# Patient Record
Sex: MALE | Race: WHITE | Employment: OTHER | URBAN - METROPOLITAN AREA
[De-identification: names, ages, dates, MRNs, and addresses within clinical notes are randomized per-mention and may not be internally consistent; named-entity substitution may affect disease eponyms.]

---

## 2021-12-21 RX ORDER — FINASTERIDE 1 MG/1
1 TABLET, FILM COATED ORAL DAILY
COMMUNITY

## 2021-12-21 NOTE — PROGRESS NOTES
Have you been tested for COVID  No           Have you been told you were positive for COVID Yes 09/2021  Have you had any known exposure to someone that is positive for COVID No  Do you have a cough                   No              Do you have shortness of breath No                 Do you have a sore throat            No                Are you having chills                    No                Are you having muscle aches. No                    Please come to the hospital wearing a mask and have your significant other wear a mask as well. Both of you should check your temperature before leaving to come here,  if it is 100 or higher please call [phone] for instruction. [facility] PRE-ADMISSION TESTING INSTRUCTIONS    The Preadmission Testing patient is instructed accordingly using the following criteria (check applicable):    ARRIVAL INSTRUCTIONS:  [x] Parking the day of Surgery is located in the Main Entrance lot. Upon entering the door, make an immediate right to the surgery reception desk    [x] Bring photo ID and insurance card    [] Bring in a copy of Living will or Durable Power of  papers.     [x] Please be sure to arrange for responsible adult to provide transportation to and from the hospital    [x] Please arrange for responsible adult to be with you for the 24 hour period post procedure due to having anesthesia      GENERAL INSTRUCTIONS:    [x] Nothing by mouth after midnight, including gum, candy, mints or water    [x] You may brush your teeth, but do not swallow any water    [x] Take medications as instructed with 1-2 oz of water    [] Stop herbal supplements and vitamins 5 days prior to procedure    [] Follow preop dosing of blood thinners per physician instructions    [] Take 1/2 dose of evening insulin, but no insulin after midnight    [] No oral diabetic medications after midnight    [] If diabetic and have low blood sugar or feel symptomatic, take 1-2oz apple juice only    [] Bring inhalers day of surgery    [] Bring C-PAP/ Bi-Pap day of surgery    [] Bring urine specimen day of surgery    [x] Shower or bath with soap, lather and rinse well, AM of Surgery, no lotion, powders or creams to surgical site    [x] Follow bowel prep as instructed per surgeon    [x] No tobacco products within 24 hours of surgery     [x] No alcohol or illegal drug use within 24 hours of surgery.     [x] Jewelry, body piercing's, eyeglasses, contact lenses and dentures are not permitted into surgery (bring cases)      [x] Please do not wear any nail polish, make up or hair products on the day of surgery    [x] You can expect a call the business day prior to procedure to notify you if your arrival time changes    [x] If you receive a survey after surgery we would greatly appreciate your comments    [] Parent/guardian of a minor must accompany their child and remain on the premises  the entire time they are under our care     [] Pediatric patients may bring favorite toy, blanket or comfort item with them    [] A caregiver or family member must remain with the patient during their stay if they are mentally handicapped, have dementia, disoriented or unable to use a call light or would be a safety concern if left unattended    [x] Please notify surgeon if you develop any illness between now and time of surgery (cold, cough, sore throat, fever, nausea, vomiting) or any signs of infections  including skin, wounds, and dental.    [x]  The Outpatient Pharmacy is available to fill your prescription here on your day of surgery, ask your preop nurse for details    [] Other instructions    EDUCATIONAL MATERIALS PROVIDED:    [] PAT Preoperative Education Packet/Booklet     [] Medication List    [] Transfusion bracelet applied with instructions    [] Shower with soap, lather and rinse well, and use CHG wipes provided the evening before surgery as instructed    [] Incentive spirometer with instructions

## 2021-12-22 ENCOUNTER — ANESTHESIA EVENT (OUTPATIENT)
Dept: ENDOSCOPY | Age: 51
End: 2021-12-22
Payer: COMMERCIAL

## 2021-12-22 ENCOUNTER — PREP FOR PROCEDURE (OUTPATIENT)
Dept: SURGERY | Age: 51
End: 2021-12-22

## 2021-12-22 ENCOUNTER — HOSPITAL ENCOUNTER (OUTPATIENT)
Age: 51
Setting detail: OUTPATIENT SURGERY
Discharge: HOME | End: 2021-12-22
Attending: SURGERY | Admitting: SURGERY
Payer: COMMERCIAL

## 2021-12-22 ENCOUNTER — ANESTHESIA (OUTPATIENT)
Dept: ENDOSCOPY | Age: 51
End: 2021-12-22
Payer: COMMERCIAL

## 2021-12-22 VITALS
OXYGEN SATURATION: 96 % | SYSTOLIC BLOOD PRESSURE: 128 MMHG | BODY MASS INDEX: 29.62 KG/M2 | DIASTOLIC BLOOD PRESSURE: 70 MMHG | TEMPERATURE: 98 F | HEART RATE: 78 BPM | RESPIRATION RATE: 16 BRPM | WEIGHT: 200 LBS | HEIGHT: 69 IN

## 2021-12-22 VITALS — OXYGEN SATURATION: 94 % | SYSTOLIC BLOOD PRESSURE: 125 MMHG | DIASTOLIC BLOOD PRESSURE: 83 MMHG

## 2021-12-22 DIAGNOSIS — Z01.818 PREOP TESTING: Primary | ICD-10-CM

## 2021-12-22 PROCEDURE — 7100000011 HC PHASE II RECOVERY - ADDTL 15 MIN: Performed by: SURGERY

## 2021-12-22 PROCEDURE — 7100000010 HC PHASE II RECOVERY - FIRST 15 MIN: Performed by: SURGERY

## 2021-12-22 PROCEDURE — 3700000000 HC ANESTHESIA ATTENDED CARE: Performed by: SURGERY

## 2021-12-22 PROCEDURE — 3609012400 HC EGD TRANSORAL BIOPSY SINGLE/MULTIPLE: Performed by: SURGERY

## 2021-12-22 PROCEDURE — 3609027000 HC COLONOSCOPY: Performed by: SURGERY

## 2021-12-22 PROCEDURE — 3700000001 HC ADD 15 MINUTES (ANESTHESIA): Performed by: SURGERY

## 2021-12-22 PROCEDURE — 88305 TISSUE EXAM BY PATHOLOGIST: CPT

## 2021-12-22 PROCEDURE — 2709999900 HC NON-CHARGEABLE SUPPLY: Performed by: SURGERY

## 2021-12-22 RX ORDER — SODIUM CHLORIDE 9 MG/ML
INJECTION, SOLUTION INTRAVENOUS CONTINUOUS
Status: CANCELLED | OUTPATIENT
Start: 2021-12-22

## 2021-12-22 ASSESSMENT — PAIN - FUNCTIONAL ASSESSMENT: PAIN_FUNCTIONAL_ASSESSMENT: 0-10

## 2021-12-22 NOTE — ANESTHESIA POSTPROCEDURE EVALUATION
Department of Anesthesiology  Postprocedure Note    Patient: Martín Simental  MRN: [id number]  YOB: 1970  Date of evaluation: 12/22/2021  Time:  3:38 PM     Procedure Summary     Date: 12/22/21 Room / Location: [address] / [facility]    Anesthesia Start: 6175 Anesthesia Stop: 0921    Procedures:       COLORECTAL CANCER SCREENING, NOT HIGH RISK (N/A )      EGD BIOPSY Diagnosis: (SCREENING)    Surgeons: Yisel Ga MD Responsible Provider: Panfilo Brownlee MD    Anesthesia Type: MAC ASA Status: 1          Anesthesia Type: MAC    Atilio Phase I: Atilio Score: 10    Atilio Phase II: Atilio Score: 10    Last vitals: Reviewed and per EMR flowsheets.        Anesthesia Post Evaluation    Patient location during evaluation: PACU  Patient participation: complete - patient participated  Level of consciousness: awake and alert  Pain score: 0  Airway patency: patent  Nausea & Vomiting: no vomiting and no nausea  Complications: no  Cardiovascular status: hemodynamically stable  Respiratory status: spontaneous ventilation  Hydration status: stable

## 2021-12-22 NOTE — ANESTHESIA PRE PROCEDURE
Department of Anesthesiology  Preprocedure Note       Name:  Patricia Garcia   Age:  46 y.o.  :  1970                                          MRN:  [id number]         Date:  2021      Surgeon: Sharyn Interiano):  Ada Serrano MD    Procedure: Procedure(s):  COLORECTAL CANCER SCREENING, NOT HIGH RISK    Medications prior to admission:   Prior to Admission medications    Medication Sig Start Date End Date Taking? Authorizing Provider   finasteride (PROPECIA) 1 MG tablet Take 1 mg by mouth daily   Yes Historical Provider, MD   amphetamine-dextroamphetamine (ADDERALL XR) 10 MG extended release capsule Take 10 mg by mouth every morning   Yes Historical Provider, MD       Current medications:    No current facility-administered medications for this encounter. Allergies:  No Known Allergies    Problem List:  There is no problem list on file for this patient.       Past Medical History:        Diagnosis Date    Abdominal pain     luq    ADD (attention deficit disorder)     Anesthesia complication     family history ONLY of Anectine allergy sister slow to wake up from anesthesia    Constipation     COVID-19        Past Surgical History:        Procedure Laterality Date    BICEPS TENDON REPAIR      TONSILLECTOMY         Social History:    Social History     Tobacco Use    Smoking status: Current Some Day Smoker    Smokeless tobacco: Current User     Types: Snuff   Substance Use Topics    Alcohol use: Yes     Comment: 12-16 beers per week                                Ready to quit: Not Answered  Counseling given: Not Answered      Vital Signs (Current):   Vitals:    21 0928 21 0654   BP:  126/82   Pulse:  82   Resp:  18   Temp:  98 °F (36.7 °C)   TempSrc:  Temporal   SpO2:  98%   Weight: 200 lb (90.7 kg) 200 lb (90.7 kg)   Height: 5' 8.5\" (1.74 m) 5' 8.5\" (1.74 m)                                              BP Readings from Last 3 Encounters:   21 126/82   16 135/82 04/06/12 118/72       NPO Status:                                                                                 BMI:   Wt Readings from Last 3 Encounters:   12/22/21 200 lb (90.7 kg)   09/30/16 189 lb (85.7 kg)   04/06/12 200 lb (90.7 kg)     Body mass index is 29.97 kg/m². CBC:   Lab Results   Component Value Date    WBC 9.4 08/20/2012    RBC 4.77 08/20/2012    HGB 15.9 08/20/2012    HCT 46.2 08/20/2012    MCV 97.0 08/20/2012    RDW 12.8 08/20/2012     08/20/2012       CMP:   Lab Results   Component Value Date     08/20/2012    K 4.0 08/20/2012     08/20/2012    CO2 25 08/20/2012    BUN 16 08/20/2012    CREATININE 0.9 08/20/2012    LABGLOM >60 08/20/2012    GLUCOSE 94 08/20/2012    CALCIUM 9.4 08/20/2012       POC Tests: No results for input(s): POCGLU, POCNA, POCK, POCCL, POCBUN, POCHEMO, POCHCT in the last 72 hours. Coags: No results found for: PROTIME, INR, APTT    HCG (If Applicable): No results found for: PREGTESTUR, PREGSERUM, HCG, HCGQUANT     ABGs: No results found for: PHART, PO2ART, YKS6COC, FJM7CKG, BEART, Y9UKJSJH     Type & Screen (If Applicable):  No results found for: LABABO, LABRH    Drug/Infectious Status (If Applicable):  No results found for: HIV, HEPCAB    COVID-19 Screening (If Applicable): No results found for: COVID19        Anesthesia Evaluation  Patient summary reviewed and Nursing notes reviewed no history of anesthetic complications (sister has a problem with anectine): Airway: Mallampati: II  TM distance: >3 FB   Neck ROM: full  Mouth opening: > = 3 FB Dental: normal exam         Pulmonary:normal exam                               Cardiovascular:  Exercise tolerance: good (>4 METS),                     Neuro/Psych:   (+) psychiatric history:            GI/Hepatic/Renal:   (+) bowel prep,           Endo/Other: Negative Endo/Other ROS                    Abdominal:             Vascular: negative vascular ROS.          Other Findings:             Anesthesia Plan      MAC     ASA 1       Induction: intravenous. Anesthetic plan and risks discussed with patient. Plan discussed with CRNA. Chart reviewed & patient seen. I agree with the above note.   BEAN Hannon - CRNA        Hiro Shaffer MD   12/22/2021

## 2021-12-22 NOTE — OP NOTE
COLONOSCOPY PROCEDURE NOTE    DATE OF PROCEDURE: 12/22/2021    PREOPERATIVE DIAGNOSIS:  Screening     POSTOPERATIVE DIAGNOSIS/FINDINGS:  Sigmoid diverticulosis, repeat in 10 years     SURGEON: Sarahi Holguin MD    ASSISTANT: None    OPERATION: Total Colonoscopy     ANESTHESIA: Local monitored anesthesia. CONDITION: Stable    COMPLICATIONS: None. Specimens Removed: as above    EBL: None      BRIEF HISTORY:  This is a 46 y.o. male who presents with the complaint of screening. It was recommended the patient undergo a colonoscopy. The risks/benefits/alternatives/expected outcomes were explained the the patient. The patient verbalized understanding and agreed to proceed. PROCEDURE:  The patient was brought into the endoscopy suite and placed in the left lateral decubitus position. A digital rectal exam was performed after the initiation of LMAC anesthesia and failed to reveal any obstructing masses or lesions. A colonoscope was inserted into the patient's anus and passed through the rectum, sigmoid, descending, transverse, and ascending colon all the way to the level of the cecum. Visualization of the cecum was confirmed by visualization of the ileo-cecal valve, confluence of the tinea, and by visualization of the light in the RLQ on the anterior abdominal wall. The scope was then withdrawn the entire length of the colon. There were no masses, polyps, or lesions noted. Sigmoid diverticulosis noted. Upon reaching the anus, the scope was retroflexed. There were no significant hemorrhoids noted. The scope was straightened and withdrawn entirely. The patient tolerated the procedure well and there were no complications.         Sarahi Holguin MD, MD  12/22/2021

## 2021-12-22 NOTE — H&P
General Surgery Consult    Patient's Name/Date of Birth: Chhaya Lanier / 1970    Date: December 22, 2021     Consulting Surgeon: Anju Virk M.D.    PCP: Sosa Tripp MD     Chief Complaint:     HPI:   Chhaya Lanier is a 46 y.o. male who presents for evaluation of GERD. He also has never had c scope in past.        Past Medical History:   Diagnosis Date    Abdominal pain     luq    ADD (attention deficit disorder)     Anesthesia complication     family history ONLY of Anectine allergy sister slow to wake up from anesthesia    Constipation     COVID-19        Past Surgical History:   Procedure Laterality Date    BICEPS TENDON REPAIR      TONSILLECTOMY         No current facility-administered medications for this encounter. No Known Allergies    History reviewed. No pertinent family history. Social History     Socioeconomic History    Marital status:      Spouse name: Not on file    Number of children: Not on file    Years of education: Not on file    Highest education level: Not on file   Occupational History    Not on file   Tobacco Use    Smoking status: Current Some Day Smoker    Smokeless tobacco: Current User     Types: Snuff   Vaping Use    Vaping Use: Never used   Substance and Sexual Activity    Alcohol use: Yes     Comment: 12-16 beers per week    Drug use: No    Sexual activity: Not on file   Other Topics Concern    Not on file   Social History Narrative    Not on file     Social Determinants of Health     Financial Resource Strain:     Difficulty of Paying Living Expenses: Not on file   Food Insecurity:     Worried About Running Out of Food in the Last Year: Not on file    Laura of Food in the Last Year: Not on file   Transportation Needs:     Lack of Transportation (Medical): Not on file    Lack of Transportation (Non-Medical):  Not on file   Physical Activity:     Days of Exercise per Week: Not on file    Minutes of Exercise per Session: Not on file   Stress:     Feeling of Stress : Not on file   Social Connections:     Frequency of Communication with Friends and Family: Not on file    Frequency of Social Gatherings with Friends and Family: Not on file    Attends Temple Services: Not on file    Active Member of [address] [place] [address] or Organizations: Not on file    Attends Club or Organization Meetings: Not on file    Marital Status: Not on file   Intimate Partner Violence:     Fear of Current or Ex-Partner: Not on file    Emotionally Abused: Not on file    Physically Abused: Not on file    Sexually Abused: Not on file   Housing Stability:     Unable to Pay for Housing in the Last Year: Not on file    Number of [place] in the Last Year: Not on file    Unstable Housing in the Last Year: Not on file           Review of Systems  Negative times ten except what was stated in HPI and PMH    Physical exam:   /82   Pulse 82   Temp 98 °F (36.7 °C) (Temporal)   Resp 18   Ht 5' 8.5\" (1.74 m)   Wt 200 lb (90.7 kg)   SpO2 98%   BMI 29.97 kg/m²   General appearance: AAOx3, NAD  Head: NCAT, PERRLA, EOMI, red conjunctiva  Neck: supple, no masses  Lungs: CTAB, equal chest rise bilateral  Heart: Reg rate  Abdomen: soft, non tender, non distended, no masses or organomegaly, no palpable hernias or defects, +bowel sounds, surgical scars were not present.   Skin; no lesions  Gu: no cva tenderness  Extremities: extremities normal, atraumatic, no cyanosis or edema      Radiology:  CT abdomen/pelvis:     Assessment:  46 y.o. male with screening colonoscopy, GERD    Plan:  EGD/Colonoscopy  Risks, benefits, alternatives, complications, discussed       Malachi Xiong MD  12/22/2021  7:08 AM

## 2021-12-22 NOTE — ANESTHESIA POSTPROCEDURE EVALUATION
Department of Anesthesiology  Postprocedure Note    Patient: Lazara Sellers  MRN: [id number]  YOB: 1970  Date of evaluation: 12/22/2021  Time:  9:21 AM     Procedure Summary     Date: 12/22/21 Room / Location: [address] / [facility]    Anesthesia Start: 7184 Anesthesia Stop: 0921    Procedures:       COLORECTAL CANCER SCREENING, NOT HIGH RISK (N/A )      EGD BIOPSY Diagnosis: (SCREENING)    Surgeons: Magda John MD Responsible Provider: Agata Jones MD    Anesthesia Type: MAC ASA Status: 1          Anesthesia Type: MAC    Atilio Phase I: Atilio Score: 10    Atilio Phase II:      Last vitals: Reviewed and per EMR flowsheets.        Anesthesia Post Evaluation    Patient location during evaluation: PACU  Patient participation: complete - patient participated  Level of consciousness: awake  Airway patency: patent  Nausea & Vomiting: no nausea and no vomiting  Complications: no  Cardiovascular status: hemodynamically stable  Respiratory status: acceptable  Hydration status: euvolemic

## 2021-12-22 NOTE — OP NOTE
ESOPHAGOGASTRODUODENOSCOPY PROCEDURE NOTE    DATE OF PROCEDURE: 12/22/2021    PREOPERATIVE DIAGNOSIS:  GERD    POSTOPERATIVE DIAGNOSIS/FINDINGS:  Schatzki ring, small hiatal hernia, few esophagea ulcers     SURGEON: Diaz Cervantes MD    ASSISTANT: None    OPERATION: Esophagogastroduodenoscopy bx    ANESTHESIA: Local monitored anesthesia. CONDITION: Stable    COMPLICATIONS: None. Specimens Removed: as above    EBL: None    BRIEF HISTORY:  This is a 46 y.o. male who presents with the complaint of GERD. It was recommended the patient undergo an esophagogastrduodenoscopy. The risks/benefits/alternatives/expected outcomes were explained the the patient. The patient verbalized understanding and agreed to proceed. PROCEDURE:  The patient was brought into the endoscopy suite and placed in the left lateral decubitus position. A bite block was placed in the patients mouth. After the initiation of LMAC anesthesia, a gastroscope was inserted into the patient's mouth and passed into the esophagus and into the stomach. Immediately upon entering the stomach the note of gastritis was made. The scope was advanced through the pylorus into the first and second portion of the duodenum making the note of normal mucosa. The scope was then withdrawn back into the stomach where it was retroflexed. There was small hiatal hernia and a Schatzki ring noted. The scope was then straightened out and antral bx done. The scope was then withdrawn the entire length of the esophagus, making the note of a normal esophagus without masses, ulcerations, or lesions noted. The scope was withdrawn entirely. The patient tolerated the procedure well and there were no complications. GEJ at 35 cm.        Diaz Cervantes MD, MD  12/22/2021

## 2022-04-21 ENCOUNTER — HOSPITAL ENCOUNTER (EMERGENCY)
Age: 52
Discharge: HOME | End: 2022-04-21
Payer: COMMERCIAL

## 2022-04-21 ENCOUNTER — APPOINTMENT (OUTPATIENT)
Dept: GENERAL RADIOLOGY | Age: 52
End: 2022-04-21
Payer: COMMERCIAL

## 2022-04-21 VITALS
DIASTOLIC BLOOD PRESSURE: 91 MMHG | HEIGHT: 68 IN | OXYGEN SATURATION: 96 % | TEMPERATURE: 97.1 F | HEART RATE: 85 BPM | WEIGHT: 190 LBS | SYSTOLIC BLOOD PRESSURE: 148 MMHG | RESPIRATION RATE: 16 BRPM | BODY MASS INDEX: 28.79 KG/M2

## 2022-04-21 DIAGNOSIS — S80.01XA CONTUSION OF RIGHT KNEE, INITIAL ENCOUNTER: Primary | ICD-10-CM

## 2022-04-21 DIAGNOSIS — S61.223A LACERATION OF LEFT MIDDLE FINGER WITH FOREIGN BODY WITHOUT DAMAGE TO NAIL, INITIAL ENCOUNTER: ICD-10-CM

## 2022-04-21 DIAGNOSIS — S81.011A LACERATION OF RIGHT KNEE WITH COMPLICATION, INITIAL ENCOUNTER: ICD-10-CM

## 2022-04-21 DIAGNOSIS — S61.422A LACERATION OF LEFT HAND WITH FOREIGN BODY, INITIAL ENCOUNTER: ICD-10-CM

## 2022-04-21 PROCEDURE — 73130 X-RAY EXAM OF HAND: CPT

## 2022-04-21 PROCEDURE — 73562 X-RAY EXAM OF KNEE 3: CPT

## 2022-04-21 PROCEDURE — 12004 RPR S/N/AX/GEN/TRK7.6-12.5CM: CPT

## 2022-04-21 PROCEDURE — 96372 THER/PROPH/DIAG INJ SC/IM: CPT

## 2022-04-21 PROCEDURE — 90714 TD VACC NO PRESV 7 YRS+ IM: CPT | Performed by: PHYSICIAN ASSISTANT

## 2022-04-21 PROCEDURE — 90471 IMMUNIZATION ADMIN: CPT | Performed by: PHYSICIAN ASSISTANT

## 2022-04-21 PROCEDURE — 99284 EMERGENCY DEPT VISIT MOD MDM: CPT

## 2022-04-21 PROCEDURE — 6360000002 HC RX W HCPCS: Performed by: PHYSICIAN ASSISTANT

## 2022-04-21 RX ORDER — CEPHALEXIN 500 MG/1
500 CAPSULE ORAL 4 TIMES DAILY
Qty: 28 CAPSULE | Refills: 0 | Status: SHIPPED | OUTPATIENT
Start: 2022-04-21 | End: 2022-04-28

## 2022-04-21 RX ORDER — HYDROCODONE BITARTRATE AND ACETAMINOPHEN 5; 325 MG/1; MG/1
1 TABLET ORAL EVERY 6 HOURS PRN
Qty: 20 TABLET | Refills: 0 | Status: SHIPPED | OUTPATIENT
Start: 2022-04-21 | End: 2022-04-26

## 2022-04-21 RX ORDER — CEFAZOLIN SODIUM 1 G/3ML
1000 INJECTION, POWDER, FOR SOLUTION INTRAMUSCULAR; INTRAVENOUS ONCE
Status: COMPLETED | OUTPATIENT
Start: 2022-04-21 | End: 2022-04-21

## 2022-04-21 RX ORDER — LIDOCAINE HYDROCHLORIDE 10 MG/ML
20 INJECTION, SOLUTION INFILTRATION; PERINEURAL ONCE
Status: DISCONTINUED | OUTPATIENT
Start: 2022-04-21 | End: 2022-04-21 | Stop reason: HOSPADM

## 2022-04-21 RX ORDER — TETANUS AND DIPHTHERIA TOXOIDS ADSORBED 2; 2 [LF]/.5ML; [LF]/.5ML
0.5 INJECTION INTRAMUSCULAR ONCE
Status: COMPLETED | OUTPATIENT
Start: 2022-04-21 | End: 2022-04-21

## 2022-04-21 RX ADMIN — CEFAZOLIN 1000 MG: 1 INJECTION, POWDER, FOR SOLUTION INTRAMUSCULAR; INTRAVENOUS at 19:23

## 2022-04-21 RX ADMIN — TETANUS AND DIPHTHERIA TOXOIDS ADSORBED 0.5 ML: 2; 2 INJECTION INTRAMUSCULAR at 19:22

## 2022-04-21 NOTE — ED PROVIDER NOTES
[facility]  Department of Emergency Medicine   ED  Encounter Note  Admit Date/RoomTime: 2022  6:13 PM  ED Room:     NAME: Frances Wu  : 1970  MRN: [id number]     Chief Complaint:  Fall (from ladder (approx 8 foot) denies hitting head, denies neck or back pain ), Knee Pain (right, with laceration ), and Hand Pain (left palm and middle finger with FB (wood))    History of Present Illness       Frances Wu is a 46 y.o. old male presenting to the emergency department by private vehicle, for a laceration to the right knee and puncture wounds to the left hand after he fell off of a ladder in his garage. There are visible foreign bodies to the left middle finger and the palm of his hand. There is a deep laceration transversely across the knee inferior to the patella. He suffered no other injuries as result of the fall.,  There is pain at injury site. Tetanus Status:  unknown. ROS   Pertinent positives and negatives are stated within HPI, all other systems reviewed and are negative. Past Medical History:  has a past medical history of Abdominal pain, ADD (attention deficit disorder), Anesthesia complication, Constipation, and COVID-19. Surgical History:  has a past surgical history that includes Tonsillectomy; Biceps tendon repair; Colonoscopy (N/A, 2021); and Upper gastrointestinal endoscopy (2021). Social History:  reports that he has been smoking. His smokeless tobacco use includes snuff. He reports current alcohol use. He reports that he does not use drugs. Family History: family history is not on file. Allergies: Patient has no known allergies.     Physical Exam  Physical Exam   Oxygen Saturation Interpretation: Normal.        ED Triage Vitals [22 1746]   BP Temp Temp Source Pulse Resp SpO2 Height Weight   (!) 148/91 97.1 °F (36.2 °C) Oral 85 16 96 % 5' 8\" (1.727 m) 190 lb (86.2 kg)         Constitutional: Alert, development consistent with age. Neck:  Normal ROM. Supple. Extremity(s):  Right: knee. Tenderness:  moderate. Swelling: None. Calf:  No evidence of DVT seen on physical exam.. Deformity: No.               ROM: full range with pain. Skin: There is a deep jagged linear laceration across the transverse portion of the knee just inferior to the patella with no obvious tissue loss. There is exposed patellar tendon down to the level of hofas fascia without any evidence of rupture. No other abnormalities identified. .       Neurovascular: Motor deficit: none. Sensory deficit: none. Pulse deficit: none. Capillary refill: normal.  Extremity(s):  Left: Middle finger and palm of the hand. Tenderness:  moderate. Swelling: Mild. Deformity: No.               ROM: Not able to be tested due to severe pain. .              Skin: There are 2 visible foreign bodies wood appearing extending through the tip of the middle finger along the volar aspect and exiting in the middle phalanx of the middle finger on the volar surface. No active bleeding. There is an additional puncture wound on the palmar surface of the hand predominantly within the third and fourth metacarpal with visible sign of foreign body and presence of wood. .       Neurovascular: Motor deficit: none. Sensory deficit: none. Pulse deficit: none. Capillary refill: normal.    Gait:  limp due to affected limb. Lymphatics: No lymphangitis or adenopathy noted. Neurological:  Oriented. Motor functions intact. Lab / Imaging Results   (All laboratory and radiology results have been personally reviewed by myself)  Labs:  No results found for this visit on 04/21/22. Imaging: All Radiology results interpreted by Radiologist unless otherwise noted.   XR KNEE RIGHT (3 VIEWS)   Final Result   Soft tissue laceration inferior to the patella and extending into Hoffa's fat   pad. XR HAND LEFT (MIN 3 VIEWS)   Final Result   No acute osseous abnormality. No radiopaque foreign bodies identified. ED Course / Medical Decision Making     Medications   lidocaine 1 % injection 20 mL (has no administration in time range)   ceFAZolin (ANCEF) injection 1,000 mg (has no administration in time range)   diptheria-tetanus toxoids (DECAVAC) 2-2 LF/0.5ML injection 0.5 mL (has no administration in time range)        Procedure(s):   PROCEDURE NOTE  4/21/22       Time: [facility]  Risks, benefits and alternatives (for applicable procedures below) described. Performed By: NIHARIKA Irwin. Informed consent: Verbal consent obtained. The patient was counseled regarding the procedure in person, it's indications, risks, potential complications and alternatives and any questions were answered. Verbal consent was obtained. Laceration #: 1. Location: right knee  Length: 10cm. The wound area was irrigated with sterile saline and draped in a sterile fashion. Local Anesthesia:  obtained with Lidocaine 1% without epinephrine. The wound was explored with the following results: Thickness: full thickness with exposed tendon and full thickness with exposed fascia. no foreign body or tendon injury seen. Debridement: None. Undermining: None. Wound Margins Revised: None. Flaps Aligned: yes. The wound was closed in two layer closure with #7 subcutaneous 3-0 Vicryl using interrupted suture(s) followed by skin closure with #17  3-0 Ethilon using interrupted suture(s). Dressing:  bacitracin, a gauze dressing and compression dressing. PROCEDURE NOTE  4/21/22       Time: 1900    LACERATION REPAIR  Risks, benefits and alternatives (for applicable procedures below) described. Performed By: NIHARIKA Irwin. Informed consent: Verbal consent obtained.   The patient was counseled regarding the procedure in person, it's indications, risks, potential complications and alternatives and any questions were answered. Verbal consent was obtained. Laceration #: 2and 3. Location: RMF  Length: 1cm. The wound area was irrigated with sterile saline and draped in a sterile fashion. Local Anesthesia:  obtained with Lidocaine 1% without epinephrine. The wound was explored with the following results: There are 2 visible foreign bodies 1 through the middle fingertip another on the palmar surface. Both were removed with hemostats without complication. There were no palpable remnants left behind. Flaps Aligned: no. The wound was cleansed and covered with a dry nonadherent dressing. The puncture wounds were not sutured closed. .  . There were no additional wounds requiring formal closure. MDM:   Imaging was obtained based on high suspicion for fracture / bony abnormality, retained foreign bodyas per history/physical findings. Plan of Care/Counseling:  Nydia Gagnon reviewed today's visit with the patient in addition to providing specific details for the plan of care and counseling regarding the diagnosis and prognosis. Questions are answered at this time and are agreeable with the plan. Assessment      1. Contusion of right knee, initial encounter    2. Laceration of left hand with foreign body, initial encounter    3. Laceration of left middle finger with foreign body without damage to nail, initial encounter    4. Laceration of right knee with complication, initial encounter      Plan   Discharged home. Patient condition is stable    New Medications     New Prescriptions    CEPHALEXIN (KEFLEX) 500 MG CAPSULE    Take 1 capsule by mouth 4 times daily for 7 days    HYDROCODONE-ACETAMINOPHEN (NORCO) 5-325 MG PER TABLET    Take 1 tablet by mouth every 6 hours as needed for Pain for up to 5 days.      Electronically signed by NIHARIKA Gagnon   DD: 4/21/22  **This report was transcribed using voice recognition software. Every effort was made to ensure accuracy; however, inadvertent computerized transcription errors may be present.   END OF ED PROVIDER NOTE       Nydia Somers  04/21/22 1921

## 2022-04-22 NOTE — ED NOTES
Non adhesive gauge, ABD dressing, kelix and Ace wrap applied at the patient's Rt knee.       Arlys Libman, RN  04/21/22 2010

## 2023-07-13 ENCOUNTER — HOSPITAL ENCOUNTER (OUTPATIENT)
Age: 53
Discharge: HOME | End: 2023-07-15
Payer: COMMERCIAL

## 2023-07-13 ENCOUNTER — HOSPITAL ENCOUNTER (OUTPATIENT)
Dept: GENERAL RADIOLOGY | Age: 53
Discharge: HOME | End: 2023-07-15
Payer: COMMERCIAL

## 2023-07-13 DIAGNOSIS — M54.2 CERVICALGIA: ICD-10-CM

## 2023-07-13 PROCEDURE — 72040 X-RAY EXAM NECK SPINE 2-3 VW: CPT

## 2023-07-31 ENCOUNTER — HOSPITAL ENCOUNTER (OUTPATIENT)
Dept: CT IMAGING | Age: 53
Discharge: HOME | End: 2023-08-02
Payer: COMMERCIAL

## 2023-07-31 DIAGNOSIS — M54.2 CERVICALGIA: ICD-10-CM

## 2023-07-31 PROCEDURE — 72125 CT NECK SPINE W/O DYE: CPT

## 2023-10-18 DIAGNOSIS — M54.12 CERVICAL RADICULOPATHY: Primary | ICD-10-CM

## (undated) DEVICE — GRADUATE TRIANG MEASURE 1000ML BLK PRNT

## (undated) DEVICE — Device

## (undated) DEVICE — SPONGE GZ W4XL4IN RAYON POLY FILL CVR W/ NONWOVEN FAB